# Patient Record
Sex: MALE | Race: WHITE
[De-identification: names, ages, dates, MRNs, and addresses within clinical notes are randomized per-mention and may not be internally consistent; named-entity substitution may affect disease eponyms.]

---

## 2017-05-04 ENCOUNTER — HOSPITAL ENCOUNTER (OUTPATIENT)
Dept: HOSPITAL 39 - GMAJ | Age: 57
End: 2017-05-04
Attending: FAMILY MEDICINE
Payer: COMMERCIAL

## 2017-05-04 DIAGNOSIS — N40.2: Primary | ICD-10-CM

## 2017-08-30 ENCOUNTER — HOSPITAL ENCOUNTER (OUTPATIENT)
Dept: HOSPITAL 39 - GMAL | Age: 57
Discharge: HOME | End: 2017-08-30
Attending: FAMILY MEDICINE
Payer: COMMERCIAL

## 2017-08-30 DIAGNOSIS — Z00.00: Primary | ICD-10-CM

## 2018-09-04 ENCOUNTER — HOSPITAL ENCOUNTER (OUTPATIENT)
Dept: HOSPITAL 39 - GMAL | Age: 58
End: 2018-09-04
Attending: FAMILY MEDICINE
Payer: COMMERCIAL

## 2018-09-04 DIAGNOSIS — Z00.01: Primary | ICD-10-CM

## 2018-12-11 ENCOUNTER — HOSPITAL ENCOUNTER (OUTPATIENT)
Dept: HOSPITAL 39 - CT | Age: 58
End: 2018-12-11
Attending: FAMILY MEDICINE
Payer: COMMERCIAL

## 2018-12-11 DIAGNOSIS — R10.84: Primary | ICD-10-CM

## 2018-12-11 DIAGNOSIS — K57.90: ICD-10-CM

## 2018-12-11 NOTE — CT
EXAM DESCRIPTION:  Abdomen/Pelvis w/wo Contrast



CLINICAL HISTORY:  58 years  Male  ABD PN



COMPARISON:  None.



TECHNIQUE:  Contiguous axial images obtained through the abdomen

and pelvis prior to and following IV contrast. Reformatted images

obtained.  



This exam was performed according to our department optimization

program which includes automated exposure control, adjustment of

the mA and/or kv according to patient size and/or use of

iterative reconstruction technique.



FINDINGS:



The liver appears unremarkable.

The spleen and pancreas appear unremarkable.

No adrenal masses.



There are nonobstructing renal calculi bilaterally. No

hydronephrosis or obstructive uropathy.



The gallbladder is present. Small phrygian cap.



No aneurysmal dilatation of the aorta.



No bowel obstruction.  The appendix is unremarkable.



No free fluid. Diverticulosis without evidence of diverticulitis.



IMPRESSION:



Nonobstructing renal calculi



No evidence of acute process



Diverticulosis without diverticulitis



Electronically signed by:  Adriana Lo MD  12/11/2018 5:54 PM

[facility] Workstation: [id number]

## 2019-03-20 ENCOUNTER — HOSPITAL ENCOUNTER (OUTPATIENT)
Dept: HOSPITAL 39 - GMAL | Age: 59
End: 2019-03-20
Attending: FAMILY MEDICINE
Payer: COMMERCIAL

## 2019-03-20 DIAGNOSIS — Z00.01: Primary | ICD-10-CM

## 2019-09-05 ENCOUNTER — HOSPITAL ENCOUNTER (OUTPATIENT)
Dept: HOSPITAL 39 - GMAL | Age: 59
End: 2019-09-05
Attending: FAMILY MEDICINE
Payer: COMMERCIAL

## 2019-09-05 DIAGNOSIS — Z12.5: ICD-10-CM

## 2019-09-05 DIAGNOSIS — E78.2: ICD-10-CM

## 2019-09-05 DIAGNOSIS — R53.83: ICD-10-CM

## 2019-09-05 DIAGNOSIS — E53.8: Primary | ICD-10-CM

## 2019-09-05 DIAGNOSIS — Z79.899: ICD-10-CM

## 2019-09-05 DIAGNOSIS — E55.9: ICD-10-CM

## 2020-10-27 ENCOUNTER — HOSPITAL ENCOUNTER (OUTPATIENT)
Dept: HOSPITAL 39 - GMAL | Age: 60
End: 2020-10-27
Attending: FAMILY MEDICINE
Payer: COMMERCIAL

## 2020-10-27 DIAGNOSIS — E53.8: Primary | ICD-10-CM

## 2020-10-27 DIAGNOSIS — R53.82: ICD-10-CM

## 2020-10-27 DIAGNOSIS — N40.1: ICD-10-CM

## 2020-10-27 DIAGNOSIS — E55.9: ICD-10-CM
